# Patient Record
Sex: FEMALE | Race: WHITE
[De-identification: names, ages, dates, MRNs, and addresses within clinical notes are randomized per-mention and may not be internally consistent; named-entity substitution may affect disease eponyms.]

---

## 2017-05-23 ENCOUNTER — HOSPITAL ENCOUNTER (OUTPATIENT)
Dept: HOSPITAL 58 - RAD | Age: 67
Discharge: HOME | End: 2017-05-23
Attending: INTERNAL MEDICINE

## 2017-05-23 VITALS — BODY MASS INDEX: 30.7 KG/M2

## 2017-05-23 DIAGNOSIS — M81.0: Primary | ICD-10-CM

## 2017-05-23 NOTE — DEXA
EXAM:  Bone densitometry. 

  

History:  Osteoporosis. 

  

Findings: 

  

Evaluation of the lumbar spine reveals a total bone mineral density of 1.050 grams per centimeter sq
uared with T-score of negative 1.1. 

  

Evaluation of the left hip reveals a total bone mineral density of 0.754 grams per centimeter square
d with T-score of negative 2.0. 

  

Evaluation of the right hip reveals a total bone mineral density of 0.720 grams per centimeter squar
ed with T-score of negative 2.3 

  

Impression:  Osteopenia of the lumbar spine and bilateral hips.

## 2018-03-26 ENCOUNTER — HOSPITAL ENCOUNTER (EMERGENCY)
Dept: HOSPITAL 58 - ED | Age: 68
Discharge: ANOTHER HOSPITAL | End: 2018-03-26
Payer: COMMERCIAL

## 2018-03-26 VITALS — TEMPERATURE: 99.2 F

## 2018-03-26 VITALS — SYSTOLIC BLOOD PRESSURE: 140 MMHG | DIASTOLIC BLOOD PRESSURE: 88 MMHG

## 2018-03-26 VITALS — BODY MASS INDEX: 30.9 KG/M2

## 2018-03-26 DIAGNOSIS — R03.0: ICD-10-CM

## 2018-03-26 DIAGNOSIS — R04.0: Primary | ICD-10-CM

## 2018-03-26 PROCEDURE — 85025 COMPLETE CBC W/AUTO DIFF WBC: CPT

## 2018-03-26 PROCEDURE — 36415 COLL VENOUS BLD VENIPUNCTURE: CPT

## 2018-03-26 PROCEDURE — 85730 THROMBOPLASTIN TIME PARTIAL: CPT

## 2018-03-26 PROCEDURE — 99285 EMERGENCY DEPT VISIT HI MDM: CPT

## 2018-03-26 PROCEDURE — 85610 PROTHROMBIN TIME: CPT

## 2018-03-26 NOTE — ED.PDOC
General


ED Provider: 


Dr. ROSEMARIE KANG





Chief Complaint: Nosebleed


Stated Complaint: nosebleed


Time Seen by Physician: 08:00 (bilateral slow bleeding  seen with nurse at all 

times )


Mode of Arrival: Walk-In


Information Source: Patient


Exam Limitations: No limitations


Primary Care Provider: 


SAHIL YUSUF





Nursing and Triage Documentation Reviewed and Agree: Yes (no trauma  no air way 

issues except for some minor bleeding)


Reviewed sepsis parameters & appropriate labs ordered?: Yes


System Inflammatory Response Syndrome: Not Applicable


Sepsis Protocol: 


For patient's 13 years and over:





Temp is 96.8 and below  and greater


Pulse >90 BPM


Resp >20/minute


Acutely Altered Mental Status





Are patient's symptoms suggestive of a new infection, such as:


   -Pneumonia


   -Skin, Soft Tissue


   -Endocarditis


   -UTI


   -Bone, Joint Infection


   -Implantable Device


   -Acute Abdominal Infection


   -Wound Infection


   -Meningitis


   -Blood Stream Catheter Infection


   -Unknown





System Inflammatory Response Syndrome: Not Applicable





Review of Systems





- Review Of Systems


Constitutional: Reports: No symptoms


Eyes: Reports: No symptoms


Ears, Nose, Mouth, Throat: Reports: Epistaxis


Respiratory: Reports: No symptoms


Cardiac: Reports: No symptoms


GI: Reports: No symptoms


: Reports: No symptoms


Musculoskeletal: Reports: No symptoms


Skin: Reports: No symptoms


Neurological: Reports: No symptoms


Endocrine: Reports: No symptoms


Hematologic/Lymphatic: Reports: No symptoms


All Other Systems: Reviewed and Negative





Past Medical History





- Past Medical History


Previously Healthy: Yes


Endocrine: Reports: None


Cardiovascular: Reports: None


Respiratory: Reports: None


Hematological: Reports: None


Gastrointestinal: Reports: None


Genitourinary: Reports: None


Neuro/Psych: Reports: Anxiety


Musculoskeletal: Reports: None


Cancer: Reports: None


Last Menstrual Period: n/a





- Surgical History


General Surgical History: Reports: None





- Family History


Family History: Reports: None





- Social History


Smoking Status: Former smoker


Hx Substance Use: Yes (MARJUANA DAILY)


Alcohol Screening: Occasionally





Physical Exam





- Physical Exam


Appearance: Well-appearing, No pain distress, Well-nourished


Eyes: HYUN, EOMI, Conjunctiva clear


ENT: Epistaxis (bilateral anterior slow )


Respiratory: Airway patent, Breath sounds clear, Breath sounds equal, 

Respirations nonlabored


Cardiovascular: RRR, Pulses normal, No rub, No murmur


GI/: Soft, Nontender, No masses, Bowel sounds normal, No Organomegaly


Musculoskeletal: Normal strength, ROM intact, No edema, No calf tenderness


Skin: Warm, Dry, Normal color


Neurological: Sensation intact, Motor intact, Reflexes intact, Cranial nerves 

intact, Alert, Oriented


Psychiatric: Affect appropriate, Mood appropriate





Procedures





- Nasal Packing/Cautery


Indications: Present: Anterior epistaxis


Packing/Cautery Procedure: Bilateral


Suction Used: No


Pressure Used to Control Bleeding: No (stopped spontanously)


Medications Used: Yes: Afrin


Hemostasis Obtained: Yes





Physician Notification





- Case Discussed


Physician Notified: luis HUGHES 


Time of Notification: 10:28 (CORDELIA HUGHES REQUESTED ANT PACK AND B/P CONTROL  IF 

BLEEDING   STILL AN ISUE THEN SEE HIM)





Critical Care Note





- Critical Care Note


Total Time (mins): 0





Course





- Course


Hematology/Chemistry: 


 03/26/18 08:17





Orders, Labs, Meds: 





Lab Review











  03/26/18 03/26/18





  08:17 08:17


 


WBC  5.48 


 


RBC  3.85 L 


 


Hgb  12.3 


 


Hct  35.7 L 


 


MCV  92.7 


 


MCH  31.9 H 


 


MCHC  34.5 


 


RDW Coeff of Mahnaz  12.9 


 


Plt Count  249 


 


Immature Gran % (Auto)  0.2 


 


Neut % (Auto)  52.4 


 


Lymph % (Auto)  37.6 


 


Mono % (Auto)  8.2 


 


Eos % (Auto)  0.7 


 


Baso % (Auto)  0.9 


 


Immature Gran # (Auto)  0.0 


 


Neut # (Auto)  2.9 


 


Lymph # (Auto)  2.1 


 


Mono # (Auto)  0.5 


 


Eos # (Auto)  0.0 


 


Baso # (Auto)  0.1 


 


PT   9.3


 


INR   0.93


 


APTT   25.5








Orders











 Category Date Time Status


 


 CBC W/ AUTO DIFF Stat LAB  03/26/18 08:17 Completed


 


 PARTIAL THROMBOPLASTIN TIME Stat LAB  03/26/18 08:17 Completed


 


 PT WITH INR Stat LAB  03/26/18 08:17 Completed


 


 Oxymetazoline HCl [Afrin Nasal Spray] MEDS  03/26/18 10:15 Discontinued





 1 spray SABRA .STK-MED ONE   


 


 Oxymetazoline HCl [Afrin Nasal Spray] MEDS  03/26/18 10:12 Stat





 2 spray SABRA ONCE STA   








Medications














Discontinued Medications














Generic Name Dose Route Start Last Admin





  Trade Name Freq  PRN Reason Stop Dose Admin


 


Oxymetazoline HCl  2 spray  03/26/18 10:12  





  Afrin Nasal Spray  SABRA  03/26/18 10:13  





  ONCE STA   











Vital Signs: 





 











  Temp Pulse Resp BP Pulse Ox


 


 03/26/18 07:45  99.2 F  108 H  20  161/97 H  99














Departure





- Departure


Time of Disposition: 11:00


Disposition: HOME SELF-CARE


Discharge Problem: 


 Anterior epistaxis





Instructions:  Nosebleed (ED)


Condition: Good


Pt referred to PMD for follow-up: Yes


IPMP verified?: No


Additional Instructions: 


Please call your Family Physician as soon as possible to schedule a follow-up 

appointment.FOLLOW UP WITH YOUR MD AND  RETURN IN AM FOR NASAL PACKING REMOVAL


Allergies/Adverse Reactions: 


Allergies





No Known Allergies Allergy (Verified 03/26/18 07:48)


 








Home Medications: 


Ambulatory Orders





Oxybutynin Chloride 5 mg PO DAILY 06/09/15 


Duloxetine HCl [Cymbalta] 60 mg PO DAILY 03/26/18 


Levocetirizine Dihydrochloride [Xyzal] 5 mg PO BID 03/26/18 


Montelukast Sodium [Singulair] 10 mg PO BEDTIME 03/26/18 


Tramadol HCl 50 mg PO BID 03/26/18 








Disposition Discussed With: Patient

## 2018-03-30 ENCOUNTER — HOSPITAL ENCOUNTER (OUTPATIENT)
Dept: HOSPITAL 58 - AMBL | Age: 68
Discharge: ANOTHER HOSPITAL | End: 2018-03-30
Attending: INTERNAL MEDICINE

## 2018-03-30 VITALS — BODY MASS INDEX: 30.9 KG/M2

## 2018-03-30 DIAGNOSIS — R04.2: Primary | ICD-10-CM

## 2019-02-26 ENCOUNTER — HOSPITAL ENCOUNTER (OUTPATIENT)
Dept: HOSPITAL 58 - RAD | Age: 69
Discharge: HOME | End: 2019-02-26
Attending: INTERNAL MEDICINE

## 2019-02-26 VITALS — BODY MASS INDEX: 30.9 KG/M2

## 2019-02-26 DIAGNOSIS — Z12.31: Primary | ICD-10-CM

## 2019-02-27 NOTE — MAMMO
EXAM:  Bilateral digital screening mammogram (2-D and 3-D) 

  

History:  Screening 

  

Comparison:  Bilateral mammogram 05/15/2015 

  

Findings:  MLO and CC views of bilateral breasts demonstrate predominately fatty replaced breast pare
nchyma.  CAD was reviewed by the radiologist.  Tomosynthesis was performed.  There are no dominant ma
sses, no suspicious microcalcifications and no architectural distortions 

  

Impression:  Stable negative mammogram.  Recommend followup routine screening mammography in 1 year. 


  

BIRADS 1, negative